# Patient Record
Sex: MALE | Race: WHITE | URBAN - METROPOLITAN AREA
[De-identification: names, ages, dates, MRNs, and addresses within clinical notes are randomized per-mention and may not be internally consistent; named-entity substitution may affect disease eponyms.]

---

## 2017-01-10 ENCOUNTER — OFFICE VISIT (OUTPATIENT)
Dept: OTOLARYNGOLOGY | Facility: CLINIC | Age: 57
End: 2017-01-10

## 2017-01-10 DIAGNOSIS — R49.0 DYSPHONIA: Primary | ICD-10-CM

## 2017-01-10 DIAGNOSIS — J38.3 VOCAL CORD GRANULOMA: ICD-10-CM

## 2017-01-10 ASSESSMENT — PAIN SCALES - GENERAL: PAINLEVEL: MILD PAIN (2)

## 2017-01-10 NOTE — Clinical Note
1/10/2017       RE: Palmer Ruffin  [address]  [facility] [address]     Dear Colleague,    Thank you for referring your patient, Palmer Ruffin, to the [facility] EAR NOSE AND THROAT at [facility]. Please see a copy of my visit note below.    HISTORY OF PRESENT ILLNESS:  Mr. Ruffin is a 56-year-old gentleman who is status post a direct microlaryngoscopy with excision of left vocal fold granuloma along with a Kenalog injection into the left vocal fold and a prolaryn injection into the right vocal fold on December 30, 2016.  He had difficult access to the larynx.  The Ossoff laryngoscope was used.  Excision of the granuloma had to be done in pieces due to difficult access.  Pathology returned no evidence of malignancy or dysplasia.  He feels his voice is partially improved.  It is slightly raspy.  He has no swallowing difficulties.  No airway difficulties.  He still has some residual numbness of the tongue and some mild residual soreness of the throat, likely due to the laryngoscopic access rather than the surgery per se.        Last 2 Scores for Patient-Answered VHI Questionnaire  VHI Total Score 11/1/2016 1/10/2017   VHI Total Score 16 26       Last 2 Scores for Patient-Answered CSI Questionnaire  CSI Total Score 11/1/2016 1/10/2017   CSI Total Score 6 13       Last 2 Scores for Patient-Answered EAT Questionnaire  EAT Total Score 11/1/2016 1/10/2017   EAT Total Score 1 2         PAST MEDICAL HISTORY:   Past Medical History   Diagnosis Date     High blood pressure      Acid reflux      Diabetes mellitus (H)      Osteoarthritis      Dysphonia      Hoarseness      Tinnitus      Obstructive sleep apnea      uses bipap        PAST SURGICAL HISTORY:   Past Surgical History   Procedure Laterality Date     Upper apnea  1994     Septoplasty       Arthroscopic  reconstruction/repair lateral ligament knee  2009     Orthopedic surgery       Genitourinary surgery       Tonsillectomy        Nasal/sinus polypectomy       Laryngoscopy, excise vocal cord lesion microscopic, combined N/A 12/30/2016     Procedure: COMBINED LARYNGOSCOPY, EXCISE VOCAL CORD LESION MICROSCOPIC;  Surgeon: Byron Keating MD;  Location: [facility] OR     Inject steroid (location) N/A 12/30/2016     Procedure: INJECT STEROID (LOCATION);  Surgeon: Byron Keating MD;  Location: [facility] OR       FAMILY HISTORY:   Family History   Problem Relation Age of Onset     Bleeding Disorder Mother      Other Cancer Father      prostate     Other Cancer Sister      breast     Other Cancer Brother      prostate     CANCER Father      CANCER Sister      CANCER Brother      DIABETES Sister      HEART DISEASE Mother      HEART DISEASE Father      Hypertension Mother      Hypertension Father        SOCIAL HISTORY:   Social History   Substance Use Topics     Smoking status: Never Smoker      Smokeless tobacco: Not on file     Alcohol Use: 0.0 oz/week       REVIEW OF SYSTEMS: Ten point review of systems is negative except for:   [facility] ROS 1/10/2017   Constitutional Weight loss, Appetite change   Neurology Numbness   Ears, Nose, Throat Ringing/noise in ears, Sore throat, Hoarseness   Cardiopulmonary Cough   Gastrointestinal/Genitourinary Heartburn/indigestion   Musculoskeletal Sore or stiff joints, Back pain   Allergy/Immunology Rash   Endocrine -   Other Rash        ALLERGIES: Cats and Lisinopril    MEDICATIONS:   Current Outpatient Prescriptions   Medication Sig Dispense Refill     HYDROcodone-acetaminophen (NORCO) 5-325 MG per tablet Take 1-2 tablets by mouth every 4 hours as needed for other (Moderate to Severe Pain) 20 tablet 0     omeprazole (PRILOSEC) 40 MG capsule Take 1 capsule (40 mg) by mouth 2 times daily as needed 30 capsule 3     ASPIRIN PO Take 325 mg by mouth daily       latanoprost (XALATAN) 0.005 % ophthalmic solution Place 1 drop Into the left eye At Bedtime       TESTOSTERONE 2 MG/GM GEL Apply topically 1 gram to  inner thigh or periclitorial area nightly       HYDROCHLOROTHIAZIDE PO Take by mouth daily       Dulaglutide (TRULICITY SC) Inject Subcutaneous once a week Takes on Sunday       Losartan Potassium (COZAAR PO)        KETOPROFEN PO Take 200 mg by mouth daily           PHYSICAL EXAMINATION:  He  is awake, alert and in no apparent distress.    His tympanic membranes are clear and intact bilaterally. External auditory canals are clear.  Nasal exam shows a mild septal deviation without obstruction.  Examination of the oral cavity shows no suspicious lesions.  There is symmetric movement of the tongue and soft palate.    The oropharynx is clear.  His neck is supple without significant adenopathy.  Pulse is regular.  Upper airway is clear.  Cranial nerves II-XII are grossly intact.              PROCEDURE:  Fiberoptic laryngoscopy was performed following topical anesthesia with 3% lidocaine and 0.25% phenylephrine.  Scope was passed through the right nostril.  This showed the vocal folds to be mobile and meet in the midline.  No nodules, polyps or ulcerations are seen.  There is a small area of eschar at the site of the granuloma.  However, this is flat and shows no evidence of early recurrence of the granuloma.  The remainder of the vocal fold appeared quite clear. The right vocal fold is mildly full consistent with the recent injection. Subglottis is clear as is the remainder of the hypopharynx.  There is good approximation between the vocal folds and the right vocal fold appears mildly full following the injection.      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  However, I advised him this is the least critical part of his process of eliminating recurrence of the granuloma and that he should continue to follow Speech Pathology instructions following an antireflux diet and using an antireflux medication will be quite  important for him.      PLAN:  I will see him back in approximately three months.       Again, thank you for allowing me to participate in the care of your patient.      Sincerely,    Byron Keating MD     cc: Tony Otero MD           [address] [place] [id number]             Ila Headley MD           [address] [address]                    SERGIO/paula

## 2017-01-10 NOTE — PATIENT INSTRUCTIONS
Plan of care:  Follow up with Dr Keating and [facility] as needed  Clinic contact information:  1. To schedule an appointment call [phone], option 1  2. To talk to the Triage RN call [phone], option 3  3. If you need to speak to Sandra or get a message to your doctor on a Friday, call the triage RN  4. SandraRN: [phone]  5. Surgery scheduling:      Cherry Bill: [phone]      Janneth Modi: [phone]  6. Fax: [phone]

## 2017-01-10 NOTE — PROGRESS NOTES
HISTORY OF PRESENT ILLNESS:  Mr. Ruffin is a 56-year-old gentleman who is status post a direct microlaryngoscopy with excision of left vocal fold granuloma along with a Kenalog injection into the left vocal fold and a prolaryn injection into the right vocal fold on December 30, 2016.  He had difficult access to the larynx.  The Ossoff laryngoscope was used.  Excision of the granuloma had to be done in pieces due to difficult access.  Pathology returned no evidence of malignancy or dysplasia.  He feels his voice is partially improved.  It is slightly raspy.  He has no swallowing difficulties.  No airway difficulties.  He still has some residual numbness of the tongue and some mild residual soreness of the throat, likely due to the laryngoscopic access rather than the surgery per se.        Last 2 Scores for Patient-Answered VHI Questionnaire  VHI Total Score 11/1/2016 1/10/2017   VHI Total Score 16 26       Last 2 Scores for Patient-Answered CSI Questionnaire  CSI Total Score 11/1/2016 1/10/2017   CSI Total Score 6 13       Last 2 Scores for Patient-Answered EAT Questionnaire  EAT Total Score 11/1/2016 1/10/2017   EAT Total Score 1 2         PAST MEDICAL HISTORY:   Past Medical History   Diagnosis Date     High blood pressure      Acid reflux      Diabetes mellitus (H)      Osteoarthritis      Dysphonia      Hoarseness      Tinnitus      Obstructive sleep apnea      uses bipap        PAST SURGICAL HISTORY:   Past Surgical History   Procedure Laterality Date     Upper apnea  1994     Septoplasty       Arthroscopic  reconstruction/repair lateral ligament knee  2009     Orthopedic surgery       Genitourinary surgery       Tonsillectomy       Nasal/sinus polypectomy       Laryngoscopy, excise vocal cord lesion microscopic, combined N/A 12/30/2016     Procedure: COMBINED LARYNGOSCOPY, EXCISE VOCAL CORD LESION MICROSCOPIC;  Surgeon: Byron Keating MD;  Location: [facility] OR     Inject steroid (location) N/A  12/30/2016     Procedure: INJECT STEROID (LOCATION);  Surgeon: Byron Keating MD;  Location: [facility] OR       FAMILY HISTORY:   Family History   Problem Relation Age of Onset     Bleeding Disorder Mother      Other Cancer Father      prostate     Other Cancer Sister      breast     Other Cancer Brother      prostate     CANCER Father      CANCER Sister      CANCER Brother      DIABETES Sister      HEART DISEASE Mother      HEART DISEASE Father      Hypertension Mother      Hypertension Father        SOCIAL HISTORY:   Social History   Substance Use Topics     Smoking status: Never Smoker      Smokeless tobacco: Not on file     Alcohol Use: 0.0 oz/week       REVIEW OF SYSTEMS: Ten point review of systems is negative except for:   [facility] ROS 1/10/2017   Constitutional Weight loss, Appetite change   Neurology Numbness   Ears, Nose, Throat Ringing/noise in ears, Sore throat, Hoarseness   Cardiopulmonary Cough   Gastrointestinal/Genitourinary Heartburn/indigestion   Musculoskeletal Sore or stiff joints, Back pain   Allergy/Immunology Rash   Endocrine -   Other Rash        ALLERGIES: Cats and Lisinopril    MEDICATIONS:   Current Outpatient Prescriptions   Medication Sig Dispense Refill     HYDROcodone-acetaminophen (NORCO) 5-325 MG per tablet Take 1-2 tablets by mouth every 4 hours as needed for other (Moderate to Severe Pain) 20 tablet 0     omeprazole (PRILOSEC) 40 MG capsule Take 1 capsule (40 mg) by mouth 2 times daily as needed 30 capsule 3     ASPIRIN PO Take 325 mg by mouth daily       latanoprost (XALATAN) 0.005 % ophthalmic solution Place 1 drop Into the left eye At Bedtime       TESTOSTERONE 2 MG/GM GEL Apply topically 1 gram to inner thigh or periclitorial area nightly       HYDROCHLOROTHIAZIDE PO Take by mouth daily       Dulaglutide (TRULICITY SC) Inject Subcutaneous once a week Takes on Sunday       Losartan Potassium (COZAAR PO)        KETOPROFEN PO Take 200 mg by mouth daily           PHYSICAL  EXAMINATION:  He  is awake, alert and in no apparent distress.    His tympanic membranes are clear and intact bilaterally. External auditory canals are clear.  Nasal exam shows a mild septal deviation without obstruction.  Examination of the oral cavity shows no suspicious lesions.  There is symmetric movement of the tongue and soft palate.    The oropharynx is clear.  His neck is supple without significant adenopathy.  Pulse is regular.  Upper airway is clear.  Cranial nerves II-XII are grossly intact.              PROCEDURE:  Fiberoptic laryngoscopy was performed following topical anesthesia with 3% lidocaine and 0.25% phenylephrine.  Scope was passed through the right nostril.  This showed the vocal folds to be mobile and meet in the midline.  No nodules, polyps or ulcerations are seen.  There is a small area of eschar at the site of the granuloma.  However, this is flat and shows no evidence of early recurrence of the granuloma.  The remainder of the vocal fold appeared quite clear. The right vocal fold is mildly full consistent with the recent injection. Subglottis is clear as is the remainder of the hypopharynx.  There is good approximation between the vocal folds and the right vocal fold appears mildly full following the injection.      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  However, I advised him this is the least critical part of his process of eliminating recurrence of the granuloma and that he should continue to follow Speech Pathology instructions following an antireflux diet and using an antireflux medication will be quite important for him.      PLAN:  I will see him back in approximately three months.      cc: Tony tOero MD           [address] [place] [id number]             Ila Headley MD           [address] [address]                     GG/paula

## 2017-01-15 NOTE — PROGRESS NOTES
[facility] VOICE [facility]  Byron Keating Jr., M.D., F.A.C.S.  Monique Randolph M.D., M.P.H.  Liliam Raymond, Ph.D., CCC/SLP  Elsy Tadeo M.M. (voice), M.MARINA., CCC/SLP  Vince Moses M.M. (voice), M.A., [facility]/SLP    [facility] VOICE [facility]  VOICE/SPEECH/BREATHING THERAPY  CLINICAL FOLLOW-UP AND LARYNGEAL EXAMINATION REPORT    Patient: Palmer Ruffin  Date of Service: 1/10/2017    HISTORY  PATIENT INFORMATION  Palmer Ruffin was seen for follow-up in conjunction with a visit to Dr. Keating today.  Please also refer to Dr. Keating s dictation.  He was last seen on 11/01/16.  At that time, decision was made to undergo surgical excision of his left vocal fold granuloma in the OR, as he has been unable to undergo an in-office procedure.    PROGRESS SINCE LAST VISIT  Mr. Ruffin reports:    surgery was 12/30; right vocal fold injection was done as well as excision of the left granuloma    He has followed the voice restrictions, and gradually increased his voice use lately    voice quality is weak; he tried a little singing in [employer] the other day, but wasn't able to do much    OBJECTIVE FINDINGS  VOICE AND SPEECH EVALUATION  Mr. Ruffin presents today with a voice quality that is characterized by     Mild to moderate inconsistent roughness    Quality can be very clear at higher pitches in sustained phonation, but speech is at a lower pitch with considerable roughness    Inconsistent mild breathiness    There does not appear to be an overt attempt to reduce the roughness; easy-onset technique is not apparent    CAPE-V Overall Severity:  14/100      LARYNGEAL EXAMINATION    Endoscopic laryngeal examination was performed by:  Byron Keating MD  I provided technical support, and provided the protocol of instructions for the patient.  Type of exam:   Flexible endoscopy with chip-tip technology  This exam shows:    Laryngeal and Vocal Fold Mucosa    There is very mild residual irritation at the site of the surgery, with  "approximately 1mm of white mucosal irritation at the left vocal process    There is still excessive irregular tissue of the ventricular folds bilaterally; these areas approximate variably on phonation, sometimes providing an additional vibratory source    Despite the right injection, there is still a posterior pattern of closure on phonation, with weak closure at the midfold, and the posterior area impacting on phonation  o Impact to the site of the granuloma is minimal on very gentle phonation  o More aggressive phonation may have more impact posteriorly, but it is difficult to assess due to the ventricular tissue approximating    Therapy probes show   o The redundant ventricular tissue does retract on \"yawn-sigh\" phonation    Stroboscopy was not warranted.    Dr. Keating and I reviewed this laryngeal exam with Mr. Ruffin today, and I provided pertinent explanations, as well as written information:    The left granuloma is nearly completely resolved, and should resolve completely with continued healing and low impact    Dr. Keating has urged continued voice conservation, to prevent recurrence of the granuloma    He urged continued therapy for improved vigilance about vocal technique    THERAPEUTIC ACTIVITIES  Today Mr. Ruffin participated in the following therapeutic activities:    [place] concepts of the effect of the redundant ventricular tissue on his phonatory technique and voice quality    [place] concepts of continued voice conservation techniques, including both technique changes as well as voice limitations.    In response to his questions, I provided explanations of:  o The difference in vocal fold impact and voice quality with different vowels  o The effect of trumpet playing on his glottic impact; tonguing is not recommended during trumpet playing as the impact is likely to be quite high, therefore trumpet playing is not recommended.    IMPRESSIONS/ RECOMMENDATIONS/ PLAN  IMPRESSIONS / " "RECOMMENDATIONS  Based on today s laryngeal examination, it appears that:    The surgical procedure was successful in reducing the left vocal fold granuloma and improving right vocal fold fullness with injection    Reduced vocal fold impact is still necessary to prevent recurrence of the granuloma    Voice quality is still dysphonic; this is partly due to the vibration of the redundant ventricular tissue    Voice quality can be improved with \"yawn-sigh\" technique; it is likely this also reduced impact to the surgical site, and is recommended for all phonation for the next three months    Voice conservation and vigilance for optimal techniuqe are recommended for optima voice quality and prevention of recurrence of the granuloma    I have provided Mr. Ruffin a DVD of his exam videos    I will see Mr. Ruffin in three months when he returns to see Dr. Keating.    TREATMENT PLAN    I will see Mr. Ruffin in three months for follow-up along with Dr. Keating.    PRIMARY ICD-10 code: R49.0 (Dysphonia)  SECONDARY ICD-10 code:  J38.3 (Vocal process granuloma)    TOTAL SERVICE TIME: 60 minutes  EVALUATION OF VOICE AND RESONANCE: (37398): 30 minutes    TREATMENT (62058): 30 minutes  NO CHARGE FACILITY FEE (82147)    Liliam Raymond, Ph.D., [facility]-SLP  Speech-Language Pathologist  Director, [facility]  [phone]        "